# Patient Record
Sex: MALE | Race: WHITE | NOT HISPANIC OR LATINO | Employment: UNEMPLOYED | URBAN - METROPOLITAN AREA
[De-identification: names, ages, dates, MRNs, and addresses within clinical notes are randomized per-mention and may not be internally consistent; named-entity substitution may affect disease eponyms.]

---
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
## 2024-05-30 ENCOUNTER — HOSPITAL ENCOUNTER (EMERGENCY)
Facility: HOSPITAL | Age: 19
Discharge: HOME | End: 2024-05-30
Attending: EMERGENCY MEDICINE
Payer: COMMERCIAL

## 2024-05-30 VITALS
WEIGHT: 125 LBS | TEMPERATURE: 97 F | SYSTOLIC BLOOD PRESSURE: 121 MMHG | BODY MASS INDEX: 17.5 KG/M2 | HEIGHT: 71 IN | OXYGEN SATURATION: 98 % | DIASTOLIC BLOOD PRESSURE: 65 MMHG | RESPIRATION RATE: 18 BRPM | HEART RATE: 65 BPM

## 2024-05-30 DIAGNOSIS — S29.019A STRAIN OF MUSCLE AT THORAX LEVEL: Primary | ICD-10-CM

## 2024-05-30 DIAGNOSIS — M54.9 MUSCULOSKELETAL BACK PAIN: ICD-10-CM

## 2024-05-30 PROCEDURE — 99284 EMERGENCY DEPT VISIT MOD MDM: CPT | Performed by: EMERGENCY MEDICINE

## 2024-05-30 PROCEDURE — 96372 THER/PROPH/DIAG INJ SC/IM: CPT

## 2024-05-30 PROCEDURE — 99283 EMERGENCY DEPT VISIT LOW MDM: CPT

## 2024-05-30 RX ORDER — KETOROLAC TROMETHAMINE 30 MG/ML
15 INJECTION, SOLUTION INTRAMUSCULAR; INTRAVENOUS ONCE
Status: COMPLETED | OUTPATIENT
Start: 2024-05-30 | End: 2024-05-30

## 2024-05-30 RX ORDER — METHOCARBAMOL 500 MG/1
500 TABLET, FILM COATED ORAL 3 TIMES DAILY PRN
Qty: 20 TABLET | Refills: 0 | Status: SHIPPED | OUTPATIENT
Start: 2024-05-30

## 2024-05-30 RX ORDER — ACETAMINOPHEN 325 MG/1
975 TABLET ORAL ONCE
Status: COMPLETED | OUTPATIENT
Start: 2024-05-30 | End: 2024-05-30

## 2024-05-30 RX ORDER — LIDOCAINE 50 MG/G
1 PATCH TOPICAL ONCE
Status: DISCONTINUED | OUTPATIENT
Start: 2024-05-30 | End: 2024-05-30 | Stop reason: HOSPADM

## 2024-05-30 RX ADMIN — ACETAMINOPHEN 975 MG: 325 TABLET ORAL at 07:48

## 2024-05-30 RX ADMIN — LIDOCAINE 1 PATCH: 50 PATCH CUTANEOUS at 07:49

## 2024-05-30 RX ADMIN — KETOROLAC TROMETHAMINE 15 MG: 30 INJECTION, SOLUTION INTRAMUSCULAR at 07:48

## 2024-05-30 NOTE — Clinical Note
Bucky Echevarria was seen and treated in our emergency department on 5/30/2024.                Diagnosis: thoracic back muscle strain    Bucky  may return to work on return date.    He may return on this date: 06/01/2024         If you have any questions or concerns, please don't hesitate to call.      Malcolm Tom, DO    ______________________________           _______________          _______________  Hospital Representative                              Date                                Time

## 2024-05-30 NOTE — ED PROVIDER NOTES
"History  Chief Complaint   Patient presents with    Shoulder Pain     Left shoulder pain since after work Friday. Lifting ranges from ground to shoulder height, weight varies 50 lbs and less.     Patient is a 18-year-old male who presents for evaluation of left upper back/shoulder blade pain.  Patient says has been present for the last 3 to 4 days.  Patient thinks that he has a \"pinched nerve\" in the shoulder as some of the pain radiates down the back of the arm.  Denies any weakness in the arm.  Denies any direct trauma to the shoulder.  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  Says the pain is worse with movement of the shoulder.  Denies any anterior shoulder discomfort.  Patient has point tenderness to the medial scapular area and some increased muscle tension        None       History reviewed. No pertinent past medical history.    History reviewed. No pertinent surgical history.    History reviewed. No pertinent family history.  I have reviewed and agree with the history as documented.    E-Cigarette/Vaping    E-Cigarette Use Current Every Day User      E-Cigarette/Vaping Substances     Social History     Tobacco Use    Smoking status: Some Days     Types: Cigarettes    Smokeless tobacco: Never    Tobacco comments:     Smokes one cigarette once in a while    Vaping Use    Vaping status: Every Day   Substance Use Topics    Drug use: Yes     Types: Marijuana       Review of Systems   Constitutional:  Negative for chills, fever and unexpected weight change.   HENT:  Negative for congestion, sore throat and trouble swallowing.    Eyes:  Negative for pain, discharge and itching.   Respiratory:  Negative for cough, chest tightness, shortness of breath and wheezing.    Cardiovascular:  Negative for chest pain, palpitations and leg swelling.   Gastrointestinal:  Negative for abdominal pain, blood in stool, diarrhea, nausea and vomiting.   Endocrine: Negative for polyuria.   Genitourinary:  Negative for difficulty " urinating, dysuria, frequency and hematuria.   Musculoskeletal:  Positive for back pain. Negative for arthralgias.   Neurological:  Negative for dizziness, syncope, weakness, light-headedness and headaches.       Physical Exam  Physical Exam  Vitals and nursing note reviewed.   Constitutional:       General: He is not in acute distress.     Appearance: He is well-developed.   HENT:      Head: Normocephalic and atraumatic.      Right Ear: External ear normal.      Left Ear: External ear normal.   Eyes:      Conjunctiva/sclera: Conjunctivae normal.      Pupils: Pupils are equal, round, and reactive to light.   Cardiovascular:      Rate and Rhythm: Normal rate and regular rhythm.      Heart sounds: Normal heart sounds. No murmur heard.     No friction rub. No gallop.   Pulmonary:      Effort: Pulmonary effort is normal. No respiratory distress.      Breath sounds: Normal breath sounds. No wheezing or rales.   Abdominal:      General: Bowel sounds are normal. There is no distension.      Palpations: Abdomen is soft.      Tenderness: There is no abdominal tenderness. There is no guarding.   Musculoskeletal:         General: No tenderness or deformity. Normal range of motion.      Cervical back: Normal range of motion.        Back:    Lymphadenopathy:      Cervical: No cervical adenopathy.   Skin:     General: Skin is warm and dry.   Neurological:      General: No focal deficit present.      Mental Status: He is alert and oriented to person, place, and time. Mental status is at baseline.      Cranial Nerves: No cranial nerve deficit.      Sensory: No sensory deficit.      Motor: No weakness or abnormal muscle tone.   Psychiatric:         Behavior: Behavior normal.         Vital Signs  ED Triage Vitals   Temperature Pulse Respirations Blood Pressure SpO2   05/30/24 0738 05/30/24 0734 05/30/24 0734 05/30/24 0734 05/30/24 0734   (!) 97 °F (36.1 °C) 68 16 111/60 99 %      Temp Source Heart Rate Source Patient Position -  "Orthostatic VS BP Location FiO2 (%)   05/30/24 0738 05/30/24 0734 05/30/24 0734 05/30/24 0734 --   Tympanic Monitor Sitting Right arm       Pain Score       05/30/24 0734       3           Vitals:    05/30/24 0734   BP: 111/60   Pulse: 68   Patient Position - Orthostatic VS: Sitting         Visual Acuity      ED Medications  Medications   ketorolac (TORADOL) injection 15 mg (has no administration in time range)   acetaminophen (TYLENOL) tablet 975 mg (has no administration in time range)   lidocaine (LIDODERM) 5 % patch 1 patch (has no administration in time range)       Diagnostic Studies  Results Reviewed       None                   No orders to display              Procedures  Procedures         ED Course         CRAFFT      Flowsheet Row Most Recent Value   CRAFFT Initial Screen: During the past 12 months, did you:    1. Drink any alcohol (more than a few sips)?  Yes Filed at: 05/30/2024 0736   2. Smoke any marijuana or hashish Yes Filed at: 05/30/2024 0736   3. Use anything else to get high? (\"anything else\" includes illegal drugs, over the counter and prescription drugs, and things that you sniff or 'pulliam')? No Filed at: 05/30/2024 0736   CRAFFT Full Screen: During the past 12 months:    1. Have you ever ridden in a car driven by someone (including yourself) who was \"high\" or had been using alcohol or drugs? 0 Filed at: 05/30/2024 0736   2. Do you ever use alcohol or drugs to relax, feel better about yourself, or fit in? 0 Filed at: 05/30/2024 0736   3. Do you ever use alcohol/drugs while you are by yourself, alone? 0 Filed at: 05/30/2024 0736   4. Do you ever forget things you did while using alcohol or drugs? 0 Filed at: 05/30/2024 0736   5. Do your family or friends ever tell you that you should cut down on your drinking or drug use? 0 Filed at: 05/30/2024 0736   6. Have you gotten into trouble while you were using alcohol or drugs? 0 Filed at: 05/30/2024 0736   CRAFFT Score 0 Filed at: 05/30/2024 0736 "                                            Medical Decision Making  18-year-old male presenting for left upper back pain.  Point tenderness to the medial scapular area.  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  Worse with movement.  Believe symptoms secondary to muscle strain/spasm.   Patient has no bony tenderness in the shoulder area, do not believe imaging is required at this time   will treat with Toradol, Tylenol, Lidoderm patch.  Will give referral for comprehensive spine program.  Number given for orthopedic surgery symptoms persist    Problems Addressed:  Musculoskeletal back pain: acute illness or injury  Strain of muscle at thorax level: acute illness or injury    Risk  OTC drugs.  Prescription drug management.             Disposition  Final diagnoses:   Strain of muscle at thorax level   Musculoskeletal back pain     Time reflects when diagnosis was documented in both MDM as applicable and the Disposition within this note       Time User Action Codes Description Comment    5/30/2024  7:43 AM Malcolm Tom Add [S29.019A] Strain of muscle at thorax level     5/30/2024  7:43 AM Malcolm Tom Add [M54.9] Musculoskeletal back pain           ED Disposition       ED Disposition   Discharge    Condition   Stable    Date/Time   Thu May 30, 2024 0742    Comment   Bucky Echevarria discharge to home/self care.                   Follow-up Information       Follow up With Specialties Details Why Contact Info Additional Information    [facility] Schedule an appointment as soon as possible for a visit  For follow up of symptoms if they persist [address] [place] [id number] [phone] [facility] [place] [address], [place], [place], [address] [phone]            Patient's Medications   Discharge Prescriptions    METHOCARBAMOL (ROBAXIN) 500 MG TABLET    Take 1 tablet (500 mg  total) by mouth 3 (three) times a day as needed for muscle spasms       Start Date: 5/30/2024 End Date: --       Order Dose: 500 mg       Quantity: 20 tablet    Refills: 0           PDMP Review       None            ED Provider  Electronically Signed by             Malcolm Tom DO  05/30/24 0752

## 2024-05-31 ENCOUNTER — TELEPHONE (OUTPATIENT)
Dept: PHYSICAL THERAPY | Facility: OTHER | Age: 19
End: 2024-05-31

## 2024-05-31 NOTE — TELEPHONE ENCOUNTER
Call placed to the patient per Comprehensive Sp[facility] Program referral.    Voice message left for patient to call back. Phone number and hours of business provided.     This is the 1st attempt to reach the patient.  Will defer per protocol.

## 2024-06-13 ENCOUNTER — APPOINTMENT (OUTPATIENT)
Dept: PHYSICAL THERAPY | Facility: CLINIC | Age: 19
End: 2024-06-13

## 2024-06-14 ENCOUNTER — OCCMED (OUTPATIENT)
Dept: URGENT CARE | Facility: MEDICAL CENTER | Age: 19
End: 2024-06-14

## 2024-06-14 DIAGNOSIS — Z02.89 ENCOUNTER FOR PHYSICAL EXAMINATION RELATED TO EMPLOYMENT: Primary | ICD-10-CM
